# Patient Record
Sex: FEMALE | Race: WHITE | NOT HISPANIC OR LATINO | Employment: FULL TIME | URBAN - METROPOLITAN AREA
[De-identification: names, ages, dates, MRNs, and addresses within clinical notes are randomized per-mention and may not be internally consistent; named-entity substitution may affect disease eponyms.]

---

## 2020-06-16 ENCOUNTER — RECORDS - HEALTHEAST (OUTPATIENT)
Dept: LAB | Facility: CLINIC | Age: 20
End: 2020-06-16

## 2020-06-17 LAB
C TRACH DNA SPEC QL PROBE+SIG AMP: NEGATIVE
N GONORRHOEA DNA SPEC QL NAA+PROBE: NEGATIVE

## 2021-07-22 ENCOUNTER — LAB REQUISITION (OUTPATIENT)
Dept: LAB | Facility: CLINIC | Age: 21
End: 2021-07-22

## 2021-07-22 DIAGNOSIS — Z00.00 ENCOUNTER FOR GENERAL ADULT MEDICAL EXAMINATION WITHOUT ABNORMAL FINDINGS: ICD-10-CM

## 2021-07-22 PROCEDURE — 87491 CHLMYD TRACH DNA AMP PROBE: CPT | Performed by: NURSE PRACTITIONER

## 2021-07-25 LAB
C TRACH DNA SPEC QL PROBE+SIG AMP: NEGATIVE
N GONORRHOEA DNA SPEC QL NAA+PROBE: NEGATIVE

## 2022-08-04 ENCOUNTER — OFFICE VISIT (OUTPATIENT)
Dept: FAMILY MEDICINE | Facility: CLINIC | Age: 22
End: 2022-08-04
Payer: COMMERCIAL

## 2022-08-04 VITALS
TEMPERATURE: 98.2 F | HEART RATE: 77 BPM | SYSTOLIC BLOOD PRESSURE: 129 MMHG | RESPIRATION RATE: 21 BRPM | OXYGEN SATURATION: 98 % | WEIGHT: 170.5 LBS | DIASTOLIC BLOOD PRESSURE: 79 MMHG

## 2022-08-04 DIAGNOSIS — J06.9 ACUTE URI: Primary | ICD-10-CM

## 2022-08-04 PROCEDURE — U0003 INFECTIOUS AGENT DETECTION BY NUCLEIC ACID (DNA OR RNA); SEVERE ACUTE RESPIRATORY SYNDROME CORONAVIRUS 2 (SARS-COV-2) (CORONAVIRUS DISEASE [COVID-19]), AMPLIFIED PROBE TECHNIQUE, MAKING USE OF HIGH THROUGHPUT TECHNOLOGIES AS DESCRIBED BY CMS-2020-01-R: HCPCS | Performed by: FAMILY MEDICINE

## 2022-08-04 PROCEDURE — 99203 OFFICE O/P NEW LOW 30 MIN: CPT | Mod: CS | Performed by: FAMILY MEDICINE

## 2022-08-04 PROCEDURE — U0005 INFEC AGEN DETEC AMPLI PROBE: HCPCS | Performed by: FAMILY MEDICINE

## 2022-08-04 RX ORDER — NORGESTIMATE AND ETHINYL ESTRADIOL 7DAYSX3 LO
KIT ORAL
COMMUNITY
Start: 2021-07-22

## 2022-08-04 NOTE — LETTER
August 4, 2022      Amador Fitzpatrick  [address]  [facility] [address]        To Whom It May Concern,     Amador Fitzpatrick was seen today. Please excuse her from work until she is feeling better. Probably should be ok in 3-4 days.       Sincerely,        Chidi Quinn MD

## 2022-08-05 LAB — SARS-COV-2 RNA RESP QL NAA+PROBE: NEGATIVE

## 2022-08-05 NOTE — PROGRESS NOTES
ICD-10-CM    1. Acute URI  J06.9 Symptomatic; Unknown COVID-19 Virus (Coronavirus) by PCR Nose   likely viral. strong likelihood of covid given exposure. Symptomatic therapy and follow up discussed    -------------------------------  Amador Fitzpatrick with presents with 1 days symptoms including sore throat, nausea, cold sweats, mild lightheaded episode. No trouble breathing. No cough. .    Treatment measures tried include None tried.  Exposures--bf with covid currently.     Current Outpatient Medications   Medication Sig Dispense Refill     norgestim-eth estrad triphasic (TRI-LO-SPRINTEC) 0.18/0.215/0.25 MG-25 MCG tablet        sertraline (ZOLOFT) 50 MG tablet          ROS otherwise negative for resp., ID,  HEENT symptoms.    Objective: /79 (BP Location: Right arm, Patient Position: Sitting, Cuff Size: Adult Regular)   Pulse 77   Temp 98.2  F (36.8  C) (Tympanic)   Resp 21   Wt 77.3 kg (170 lb 8 oz)   SpO2 98%   Exam:  GENERAL APPEARANCE: healthy, alert and no distress  EYES: Eyes grossly normal to inspection  HENT: ear canals and TM's normal and nose and mouth without ulcers or lesions  NECK: no adenopathy, no asymmetry, masses, or scars and thyroid normal to palpation  RESP: lungs clear to auscultation - no rales, rhonchi or wheezes  CV: regular rates and rhythm, no murmur

## 2022-08-05 NOTE — PATIENT INSTRUCTIONS
What are the symptoms of COVID-19?  Symptoms can include fever, cough, shortness of breath, chills, headache, muscle pain sore throat, fatigue, runny or stuffy nose, and loss of taste and smell. Other less common symptoms include nausea, vomiting, or diarrhea (watery stools).    Know when to call 911. Emergency warning signs include:  Trouble breathing or shortness of breath  Pain or pressure in the chest that doesn't go away  Feeling confused like you haven't felt before, or not being able to wake up  Bluish-colored lips or face    How can I take care of myself?  Get lots of rest. Drink extra fluids (unless a doctor has told you not to).  Take Tylenol (acetaminophen) for fever or pain. If you have liver or kidney problems, ask your family doctor if it's okay to take Tylenol   Adults:   650 mg (two 325 mg pills or tablets) every 4 to 6 hours, or...   1,000 mg (two 500 mg pills or tablets) every 8 hours as needed.  Note: Don't take more than 3,000 mg in one day. Acetaminophen is found in many medicines (both prescribed and over the counter). Read all labels to be sure you don't take too much.  For children, check the Tylenol bottle for the right dose. The dose is based on the child's age or weight.  Take over the counter medicines for your symptoms as needed. Talk to your pharmacist.  If you have other health problems (like cancer, heart failure, an organ transplant, or severe kidney disease): Call your specialty clinic if you don't feel better in the next 2 days.    These guidelines are for isolating and quarantining before returning to work, school or .   For employers, schools and day cares: This is an official notice for this person's medical guidelines for returning in-person.   For health care sites: The CDC gives different isolation and quarantine guidelines for healthcare sites, please check with these sites before arriving.     How do I self-isolate?  You isolate when you have symptoms of COVID or a  test shows you have COVID, even if you don't have symptoms.   If you DO have symptoms:  Stay home and away from others  For at least 5 days after your symptoms started, AND   You are fever free for 24 hours (with no medicine that reduces fever), AND  Your other symptoms are better.  Wear a mask for 10 full days any time you are around others.  If you DON'T have symptoms:  Stay at home and away from others for at least 5 days after your positive test.  Wear a mask for 10 full days any time you are around others.    How and when do I quarantine?  You quarantine when you may have been exposed to the virus and DON'T have symptoms.   Stay home and away from others.   You must quarantine for 5 days after your last contact with a person who has COVID.  Note: If you are fully vaccinated, you don't need to quarantine. You should still follow the steps below.   Wear a mask for 10 full days any time you're around others.  Get tested at least 5 days after you were exposed, even if you don't have symptoms.   If you start to have symptoms, isolate right away and get tested.    Where can I get more information?  [facility] COVID-19 Resource Hub: [email or web address]   CDC Quarantine & Isolation: https://www.cdc.gov/coronavirus/2019-ncov/your-health/quarantine-isolation.html   CDC - What to Do If You're Sick: https://www.cdc.gov/coronavirus/2019-ncov/if-you-are-sick/index.html  [facility] clinical trials (COVID-19 research studies): [email or web address]  [place] Department of Health COVID-19 Public Hotline: 1-477.151.6055

## 2022-08-05 NOTE — PROGRESS NOTES
"{Dia}  -------------------------------  Amador Fitzpatrick with presents with *** days symptoms including {URI SYMPTOMS2:627428}.    Treatment measures tried include {URI TREATMENTS  TRIED:000650}.  Exposures--***  Recent travel--***    Current Outpatient Medications   Medication Sig Dispense Refill     norgestim-eth estrad triphasic (TRI-LO-SPRINTEC) 0.18/0.215/0.25 MG-25 MCG tablet        sertraline (ZOLOFT) 50 MG tablet          ROS otherwise negative for resp., ID,  HEENT symptoms.    Objective: /79 (BP Location: Right arm, Patient Position: Sitting, Cuff Size: Adult Regular)   Pulse 77   Temp 98.2  F (36.8  C) (Tympanic)   Resp 21   Wt 77.3 kg (170 lb 8 oz)   SpO2 98%   Exam:  GENERAL APPEARANCE: healthy, alert and no distress  EYES: Eyes grossly normal to inspection  HENT: { EXAM CPE - HENT:358055::\"ear canals and TM's normal\",\"nose and mouth without ulcers or lesions\"}  NECK: no adenopathy, no asymmetry, masses, or scars and thyroid normal to palpation  RESP: lungs clear to auscultation - no rales, rhonchi or wheezes  CV: regular rates and rhythm, no murmur    "

## 2022-10-01 ENCOUNTER — HEALTH MAINTENANCE LETTER (OUTPATIENT)
Age: 22
End: 2022-10-01

## 2023-10-21 ENCOUNTER — HEALTH MAINTENANCE LETTER (OUTPATIENT)
Age: 23
End: 2023-10-21

## 2024-09-17 ENCOUNTER — APPOINTMENT (OUTPATIENT)
Dept: RADIOLOGY | Facility: HOSPITAL | Age: 24
End: 2024-09-17
Attending: EMERGENCY MEDICINE
Payer: COMMERCIAL

## 2024-09-17 ENCOUNTER — APPOINTMENT (OUTPATIENT)
Dept: CT IMAGING | Facility: HOSPITAL | Age: 24
End: 2024-09-17
Attending: EMERGENCY MEDICINE
Payer: COMMERCIAL

## 2024-09-17 PROCEDURE — 99285 EMERGENCY DEPT VISIT HI MDM: CPT | Mod: 25

## 2024-09-17 PROCEDURE — 73700 CT LOWER EXTREMITY W/O DYE: CPT | Mod: RT

## 2024-09-17 PROCEDURE — 73562 X-RAY EXAM OF KNEE 3: CPT | Mod: RT

## 2024-09-17 PROCEDURE — 250N000013 HC RX MED GY IP 250 OP 250 PS 637: Performed by: EMERGENCY MEDICINE

## 2024-09-17 RX ORDER — OXYCODONE AND ACETAMINOPHEN 5; 325 MG/1; MG/1
1 TABLET ORAL ONCE
Status: COMPLETED | OUTPATIENT
Start: 2024-09-17 | End: 2024-09-17

## 2024-09-17 RX ADMIN — OXYCODONE HYDROCHLORIDE AND ACETAMINOPHEN 1 TABLET: 5; 325 TABLET ORAL at 22:11

## 2024-09-17 ASSESSMENT — COLUMBIA-SUICIDE SEVERITY RATING SCALE - C-SSRS
1. IN THE PAST MONTH, HAVE YOU WISHED YOU WERE DEAD OR WISHED YOU COULD GO TO SLEEP AND NOT WAKE UP?: NO
2. HAVE YOU ACTUALLY HAD ANY THOUGHTS OF KILLING YOURSELF IN THE PAST MONTH?: NO
6. HAVE YOU EVER DONE ANYTHING, STARTED TO DO ANYTHING, OR PREPARED TO DO ANYTHING TO END YOUR LIFE?: NO

## 2024-09-18 ENCOUNTER — HOSPITAL ENCOUNTER (INPATIENT)
Facility: HOSPITAL | Age: 24
LOS: 1 days | Discharge: HOME | End: 2024-09-18
Attending: EMERGENCY MEDICINE | Admitting: FAMILY MEDICINE
Payer: COMMERCIAL

## 2024-09-18 VITALS
SYSTOLIC BLOOD PRESSURE: 125 MMHG | HEIGHT: 68 IN | TEMPERATURE: 98.4 F | DIASTOLIC BLOOD PRESSURE: 72 MMHG | OXYGEN SATURATION: 96 % | RESPIRATION RATE: 18 BRPM | WEIGHT: 176.5 LBS | BODY MASS INDEX: 26.75 KG/M2 | HEART RATE: 68 BPM

## 2024-09-18 DIAGNOSIS — M25.561 ACUTE PAIN OF RIGHT KNEE: ICD-10-CM

## 2024-09-18 DIAGNOSIS — S82.141A CLOSED FRACTURE OF RIGHT TIBIAL PLATEAU, INITIAL ENCOUNTER: Primary | ICD-10-CM

## 2024-09-18 PROBLEM — S82.209A FRACTURE, TIBIA: Status: ACTIVE | Noted: 2024-09-18

## 2024-09-18 LAB
ANION GAP SERPL CALCULATED.3IONS-SCNC: 12 MMOL/L (ref 7–15)
BUN SERPL-MCNC: 14.3 MG/DL (ref 6–20)
CALCIUM SERPL-MCNC: 9.2 MG/DL (ref 8.8–10.4)
CHLORIDE SERPL-SCNC: 105 MMOL/L (ref 98–107)
CREAT SERPL-MCNC: 0.91 MG/DL (ref 0.51–0.95)
EGFRCR SERPLBLD CKD-EPI 2021: 89 ML/MIN/1.73M2
ERYTHROCYTE [DISTWIDTH] IN BLOOD BY AUTOMATED COUNT: 11.8 % (ref 10–15)
GLUCOSE SERPL-MCNC: 107 MG/DL (ref 70–99)
HCO3 SERPL-SCNC: 24 MMOL/L (ref 22–29)
HCT VFR BLD AUTO: 35.8 % (ref 35–47)
HGB BLD-MCNC: 11.8 G/DL (ref 11.7–15.7)
MCH RBC QN AUTO: 29.6 PG (ref 26.5–33)
MCHC RBC AUTO-ENTMCNC: 33 G/DL (ref 31.5–36.5)
MCV RBC AUTO: 90 FL (ref 78–100)
PLATELET # BLD AUTO: 183 10E3/UL (ref 150–450)
POTASSIUM SERPL-SCNC: 3.6 MMOL/L (ref 3.4–5.3)
RBC # BLD AUTO: 3.98 10E6/UL (ref 3.8–5.2)
SODIUM SERPL-SCNC: 141 MMOL/L (ref 135–145)
WBC # BLD AUTO: 13.8 10E3/UL (ref 4–11)

## 2024-09-18 PROCEDURE — 85027 COMPLETE CBC AUTOMATED: CPT

## 2024-09-18 PROCEDURE — 36415 COLL VENOUS BLD VENIPUNCTURE: CPT

## 2024-09-18 PROCEDURE — 80048 BASIC METABOLIC PNL TOTAL CA: CPT

## 2024-09-18 PROCEDURE — 120N000001 HC R&B MED SURG/OB

## 2024-09-18 PROCEDURE — 250N000013 HC RX MED GY IP 250 OP 250 PS 637: Performed by: EMERGENCY MEDICINE

## 2024-09-18 PROCEDURE — 2W3LX3Z IMMOBILIZATION OF RIGHT LOWER EXTREMITY USING BRACE: ICD-10-PCS | Performed by: STUDENT IN AN ORGANIZED HEALTH CARE EDUCATION/TRAINING PROGRAM

## 2024-09-18 PROCEDURE — 99222 1ST HOSP IP/OBS MODERATE 55: CPT | Mod: GC

## 2024-09-18 PROCEDURE — 250N000013 HC RX MED GY IP 250 OP 250 PS 637

## 2024-09-18 RX ORDER — NORGESTIMATE AND ETHINYL ESTRADIOL 7DAYSX3 LO
1 KIT ORAL DAILY
Status: DISCONTINUED | OUTPATIENT
Start: 2024-09-18 | End: 2024-09-18 | Stop reason: HOSPADM

## 2024-09-18 RX ORDER — NALOXONE HYDROCHLORIDE 0.4 MG/ML
0.4 INJECTION, SOLUTION INTRAMUSCULAR; INTRAVENOUS; SUBCUTANEOUS
Status: DISCONTINUED | OUTPATIENT
Start: 2024-09-18 | End: 2024-09-18 | Stop reason: HOSPADM

## 2024-09-18 RX ORDER — LIDOCAINE 40 MG/G
CREAM TOPICAL
Status: DISCONTINUED | OUTPATIENT
Start: 2024-09-18 | End: 2024-09-18 | Stop reason: HOSPADM

## 2024-09-18 RX ORDER — CALCIUM CARBONATE 500 MG/1
1000 TABLET, CHEWABLE ORAL 4 TIMES DAILY PRN
Status: DISCONTINUED | OUTPATIENT
Start: 2024-09-18 | End: 2024-09-18 | Stop reason: HOSPADM

## 2024-09-18 RX ORDER — DICLOFENAC SODIUM 75 MG/1
75 TABLET, DELAYED RELEASE ORAL 2 TIMES DAILY PRN
COMMUNITY
Start: 2023-10-20 | End: 2024-09-18

## 2024-09-18 RX ORDER — LAMOTRIGINE 25 MG/1
50 TABLET ORAL 2 TIMES DAILY
Status: DISCONTINUED | OUTPATIENT
Start: 2024-09-18 | End: 2024-09-18

## 2024-09-18 RX ORDER — OXYCODONE AND ACETAMINOPHEN 5; 325 MG/1; MG/1
0.5 TABLET ORAL EVERY 6 HOURS PRN
Qty: 20 TABLET | Refills: 0 | Status: SHIPPED | OUTPATIENT
Start: 2024-09-18

## 2024-09-18 RX ORDER — ACETAMINOPHEN 325 MG/1
650 TABLET ORAL EVERY 6 HOURS
Qty: 60 TABLET | Refills: 0 | Status: SHIPPED | OUTPATIENT
Start: 2024-09-18

## 2024-09-18 RX ORDER — NALOXONE HYDROCHLORIDE 0.4 MG/ML
0.2 INJECTION, SOLUTION INTRAMUSCULAR; INTRAVENOUS; SUBCUTANEOUS
Status: DISCONTINUED | OUTPATIENT
Start: 2024-09-18 | End: 2024-09-18 | Stop reason: HOSPADM

## 2024-09-18 RX ORDER — LEVETIRACETAM 750 MG/1
1 TABLET ORAL 2 TIMES DAILY
COMMUNITY
Start: 2024-08-17

## 2024-09-18 RX ORDER — ONDANSETRON 4 MG/1
4 TABLET, ORALLY DISINTEGRATING ORAL EVERY 6 HOURS PRN
Status: DISCONTINUED | OUTPATIENT
Start: 2024-09-18 | End: 2024-09-18 | Stop reason: HOSPADM

## 2024-09-18 RX ORDER — ACETAMINOPHEN 325 MG/1
975 TABLET ORAL EVERY 6 HOURS
Status: DISCONTINUED | OUTPATIENT
Start: 2024-09-18 | End: 2024-09-18 | Stop reason: HOSPADM

## 2024-09-18 RX ORDER — OXYCODONE AND ACETAMINOPHEN 5; 325 MG/1; MG/1
0.5 TABLET ORAL EVERY 6 HOURS PRN
Qty: 12 TABLET | Refills: 0 | Status: SHIPPED | OUTPATIENT
Start: 2024-09-18 | End: 2024-09-18

## 2024-09-18 RX ORDER — LAMOTRIGINE 25 MG/1
25 TABLET ORAL 2 TIMES DAILY
COMMUNITY
Start: 2024-08-28

## 2024-09-18 RX ORDER — IBUPROFEN 600 MG/1
600 TABLET, FILM COATED ORAL ONCE
Status: COMPLETED | OUTPATIENT
Start: 2024-09-18 | End: 2024-09-18

## 2024-09-18 RX ORDER — LAMOTRIGINE 25 MG/1
25 TABLET ORAL 2 TIMES DAILY
Status: DISCONTINUED | OUTPATIENT
Start: 2024-09-18 | End: 2024-09-18 | Stop reason: HOSPADM

## 2024-09-18 RX ORDER — ACETAMINOPHEN 325 MG/1
975 TABLET ORAL EVERY 6 HOURS
Qty: 60 TABLET | Refills: 0 | Status: SHIPPED | OUTPATIENT
Start: 2024-09-18 | End: 2024-09-18

## 2024-09-18 RX ORDER — AMOXICILLIN 250 MG
1 CAPSULE ORAL 2 TIMES DAILY PRN
Status: DISCONTINUED | OUTPATIENT
Start: 2024-09-18 | End: 2024-09-18 | Stop reason: HOSPADM

## 2024-09-18 RX ORDER — LAMOTRIGINE 25 MG/1
25 TABLET ORAL 2 TIMES DAILY
Status: DISCONTINUED | OUTPATIENT
Start: 2024-09-18 | End: 2024-09-18

## 2024-09-18 RX ORDER — OXYCODONE HYDROCHLORIDE 5 MG/1
5 TABLET ORAL EVERY 4 HOURS PRN
Status: DISCONTINUED | OUTPATIENT
Start: 2024-09-18 | End: 2024-09-18 | Stop reason: HOSPADM

## 2024-09-18 RX ORDER — ACETAMINOPHEN 650 MG/1
650 SUPPOSITORY RECTAL EVERY 6 HOURS
Status: DISCONTINUED | OUTPATIENT
Start: 2024-09-18 | End: 2024-09-18

## 2024-09-18 RX ORDER — AMOXICILLIN 250 MG
2 CAPSULE ORAL 2 TIMES DAILY PRN
Status: DISCONTINUED | OUTPATIENT
Start: 2024-09-18 | End: 2024-09-18 | Stop reason: HOSPADM

## 2024-09-18 RX ORDER — ONDANSETRON 2 MG/ML
4 INJECTION INTRAMUSCULAR; INTRAVENOUS EVERY 6 HOURS PRN
Status: DISCONTINUED | OUTPATIENT
Start: 2024-09-18 | End: 2024-09-18 | Stop reason: HOSPADM

## 2024-09-18 RX ORDER — POLYETHYLENE GLYCOL 3350 17 G/17G
17 POWDER, FOR SOLUTION ORAL 2 TIMES DAILY PRN
Status: DISCONTINUED | OUTPATIENT
Start: 2024-09-18 | End: 2024-09-18 | Stop reason: HOSPADM

## 2024-09-18 RX ADMIN — SERTRALINE HYDROCHLORIDE 50 MG: 50 TABLET ORAL at 04:09

## 2024-09-18 RX ADMIN — IBUPROFEN 600 MG: 600 TABLET, FILM COATED ORAL at 02:11

## 2024-09-18 RX ADMIN — ACETAMINOPHEN 975 MG: 325 TABLET ORAL at 04:09

## 2024-09-18 RX ADMIN — OXYCODONE HYDROCHLORIDE 2.5 MG: 5 TABLET ORAL at 15:16

## 2024-09-18 RX ADMIN — LAMOTRIGINE 25 MG: 25 TABLET ORAL at 09:20

## 2024-09-18 RX ADMIN — OXYCODONE HYDROCHLORIDE 2.5 MG: 5 TABLET ORAL at 07:37

## 2024-09-18 RX ADMIN — ACETAMINOPHEN 975 MG: 325 TABLET ORAL at 09:20

## 2024-09-18 RX ADMIN — LEVETIRACETAM 750 MG: 500 TABLET, FILM COATED ORAL at 09:20

## 2024-09-18 RX ADMIN — OXYCODONE HYDROCHLORIDE 2.5 MG: 5 TABLET ORAL at 12:19

## 2024-09-18 ASSESSMENT — ACTIVITIES OF DAILY LIVING (ADL)
ADLS_ACUITY_SCORE: 35
DEPENDENT_IADLS:: INDEPENDENT
ADLS_ACUITY_SCORE: 35

## 2024-09-18 NOTE — DISCHARGE SUMMARY
"[facility]  Discharge Summary - Medicine & Pediatrics       Date of Admission:  9/18/2024  Date of Discharge:  9/18/2024  Discharging Provider: Dr Ann  Discharge Service: Hospitalist Service    Discharge Diagnoses   Rt leg injury  R Tibial plateau fracture    Clinically Significant Risk Factors     # Overweight: Estimated body mass index is 26.84 kg/m  as calculated from the following:    Height as of this encounter: 1.727 m (5' 8\").    Weight as of this encounter: 80.1 kg (176 lb 8 oz).       Follow-ups Needed After Discharge   Follow-up Appointments     Follow-up and recommended labs and tests       Follow-up with [facility], [address], [place]  Call [phone] to request appointment  A referral was placed by our team        {Additional important follow-up instructions/to-do's for PCP      Follow-up tibia fracture      Discharge Disposition   Discharged to home  Condition at discharge: Stable    Hospital Course   Amador Fitzpatrick is a 24 year old female admitted on 9/18/2024. She has a history of depression and epilepsy and is admitted for right knee pain and swelling after leg injury.  Waiting Orthopedic recs prior to discharge.     The following problems were addressed during her hospitalization:    Rt leg injury  R Tibial plateau fracture  Patient presented after right leg injury while playing soccer and presented with R knee pain, swelling and loss of ROM. Imaging showed mildly displaced right tibial plateau fracture.  ED provider discussed with [facility] orthopedic physician and plan was for pain management and possible outpatient follow-up.  However patient continued to be have significant knee pain and brace/cast couldn't be successfully placed.  Patient notes that she would like to avoid opioid pain meds if possible.   - Tylenol scheduled   - oxycodone PRN  - NPO  - Ortho surgery consult, appreciate recs              - recommend Surgery with dedicated " ortho-trauma doctor at [facility] within 1 week              - OK to discharge with urgent outpatient referral to [facility]              - Knee immobilizer     Hx of epilepsy   Seizures diagnosed in childhood, has been on keppra for several years.  Most recently seen by [facility],  8/28/2024 routine EEG was normal plan was to continue Keppra 750 mg twice daily and was started on lamotrigine. Patient notes that last seizure was in childhood around 6th grade.   - continue PTA keppra and lamictal   - could conside echecking keppra level     Anxiety and depression  Continue PTA Zoloft 50 mg daily    Consultations This Hospital Stay   CARE MANAGEMENT / SOCIAL WORK IP CONSULT  PHYSICAL THERAPY ADULT IP CONSULT  ORTHOPEDIC SURGERY IP CONSULT    Code Status   Full Code       The patient was discussed with Dr. Delmi Ann MD    ______________________________________________________________________    Physical Exam   Vital Signs: Temp: 98.4  F (36.9  C) Temp src: Oral BP: 122/68 Pulse: 68   Resp: 18 SpO2: 98 % O2 Device: None (Room air)    Weight: 176 lbs 8 oz    GENERAL: healthy, alert and no distress  RESP: speaking in full sentences, normal work of breathing   CV: extremities well perfused  MS: Right knee with diffuse edema and tenderness mostly on the medial and lateral aspects of the knee. ROM very limited due to pain  PSYCH: mentation appears normal, affect normal/bright       Primary Care Physician   Hannah Dooley    Discharge Orders      Crutches     Orthopedic  Referral      Reason for your hospital stay    You were hospitalized for fracture of tibia.     Follow-up and recommended labs and tests     Follow-up with [facility], [address], [place]  Call [phone] to request appointment  A referral was placed by our team     Activity    Your activity upon discharge: activity as tolerated with Right knee immobilized     NO weight bearing    No  weight bearing on your operative extremity.     Knee Immobilizer    Remove knee immobilizer twice daily for skin assessment and hygiene. Wear knee immobilizer at all times except for skin assessment and hygiene. When removing, ensure that you are safely seated or lying in bed.     Diet    Follow this diet upon discharge: Current Diet:Orders Placed This Encounter      NPO per Anesthesia Guidelines for Procedure/Surgery Except for: Meds       Significant Results and Procedures   Most Recent 3 CBC's:  Recent Labs   Lab Test 09/18/24  0404   WBC 13.8*   HGB 11.8   MCV 90        Most Recent 3 BMP's:  Recent Labs   Lab Test 09/18/24  0404      POTASSIUM 3.6   CHLORIDE 105   CO2 24   BUN 14.3   CR 0.91   ANIONGAP 12   VINCE 9.2   *   ,   Results for orders placed or performed during the hospital encounter of 09/18/24   XR Knee Right 3 Views    Narrative    EXAM: XR KNEE RIGHT 3 VIEWS  LOCATION: [facility]  DATE: 9/17/2024    INDICATION: fall, pain  COMPARISON: None.      Impression    IMPRESSION: Acute displaced vertically oriented fracture of the lateral tibial plateau. CT may be useful for further evaluation. Moderate lipohemarthrosis of the knee. Soft tissue swelling about the knee.   CT Knee Right w/o Contrast    Narrative    EXAM: CT KNEE RIGHT W/O CONTRAST  LOCATION: [facility]  DATE: 9/18/2024    INDICATION: tibial plateau fx  COMPARISON: X-ray 9/17/2024  TECHNIQUE: Noncontrast. Axial, sagittal and coronal thin-section reconstruction. Dose reduction techniques were used.     FINDINGS:     BONES:  -Mildly displaced vertical lateral tibial plateau fracture. Mild joint space depression anteriorly. Nondisplaced fracture component extends into the anterior intercondylar region.    SOFT TISSUES:  -Soft tissue edema greatest about the medial aspect of the knee. Small lipohemarthrosis.      Impression    IMPRESSION:  1.  Mildly displaced lateral tibial  plateau fracture.         Discharge Medications   Current Discharge Medication List        START taking these medications    Details   acetaminophen (TYLENOL) 325 MG tablet Take 2 tablets (650 mg) by mouth every 6 hours.  Qty: 60 tablet, Refills: 0    Associated Diagnoses: Closed fracture of right tibial plateau, initial encounter      oxyCODONE-acetaminophen (PERCOCET) 5-325 MG tablet Take 0.5 tablets by mouth every 6 hours as needed for breakthrough pain.  Qty: 20 tablet, Refills: 0    Associated Diagnoses: Closed fracture of right tibial plateau, initial encounter           CONTINUE these medications which have NOT CHANGED    Details   lamoTRIgine (LAMICTAL) 25 MG tablet Take 25 mg by mouth 2 times daily. Start with 1 tab daily for 2 weeks, then increase to 1 tab twice daily      levETIRAcetam (KEPPRA) 750 MG tablet Take 1 tablet by mouth 2 times daily.      norgestim-eth estrad triphasic (TRI-LO-SPRINTEC) 0.18/0.215/0.25 MG-25 MCG tablet       sertraline (ZOLOFT) 50 MG tablet Take 50 mg by mouth at bedtime.           STOP taking these medications       diclofenac (VOLTAREN) 75 MG EC tablet Comments:   Reason for Stopping:             Allergies   No Known Allergies

## 2024-09-18 NOTE — PROGRESS NOTES
[facility]    Progress Note - Hospitalist Service       Date of Admission:  9/18/2024    Assessment & Plan   Amador Fitzpatrick is a 24 year old female admitted on 9/18/2024. She has a history of depression and epilepsy and is admitted for right knee pain and swelling after leg injury.  Waiting Orthopedic recs prior to discharge.     Rt leg injury  R Tibial plateau fracture  Patient presented after right leg injury while playing soccer and presented with R knee pain, swelling and loss of ROM. Imaging showed mildly displaced right tibial plateau fracture.  ED provider discussed with [facility] orthopedic physician and plan was for pain management and possible outpatient follow-up.  However patient continued to be have significant knee pain and brace/cast couldn't be successfully placed.  Patient notes that she would like to avoid opioid pain meds if possible.   - Tylenol scheduled   - oxycodone PRN  - NPO  - Ortho surgery consult, appreciate recs   - recommend Surgery with dedicated ortho-trauma doctor at [facility] within 1 week   - OK to discharge with urgent outpatient referral to [facility]   - Knee immobilizer     Hx of epilepsy   Seizures diagnosed in childhood, has been on keppra for several years.  Most recently seen by [facility],  8/28/2024 routine EEG was normal plan was to continue Keppra 750 mg twice daily and was started on lamotrigine. Patient notes that last seizure was in childhood around 6th grade.   - continue PTA keppra and lamictal   - could conside echecking keppra level     Anxiety and depression  Continue PTA Zoloft 50 mg daily        Diet: NPO per Anesthesia Guidelines for Procedure/Surgery Except for: Meds    DVT Prophylaxis: Pneumatic Compression Devices  Aguirre Catheter: Not present  Fluids: PO  Lines: None     Cardiac Monitoring: None  Code Status: Full Code      Clinically Significant Risk Factors Present on Admission                          #  "Overweight: Estimated body mass index is 26.84 kg/m  as calculated from the following:    Height as of this encounter: 1.727 m (5' 8\").    Weight as of this encounter: 80.1 kg (176 lb 8 oz).              Disposition Plan      Expected Discharge Date: 09/20/2024      Destination: home          The patient's care was discussed with the Attending Physician, Dr. Awad .    Lucas Ann MD  Hospitalist Service  [facility]  Securely message with [employer] (more info)  Text page via [employer] Paging/Directory   ______________________________________________________________________    Interval History     No acute events  Pain is well managed    Physical Exam   Vital Signs: Temp: 98.4  F (36.9  C) Temp src: Oral BP: 135/81 Pulse: 87   Resp: 20 SpO2: 99 % O2 Device: None (Room air)    Weight: 176 lbs 8 oz    GENERAL: healthy, alert and no distress  RESP: speaking in full sentences, normal work of breathing   CV: extremities well perfused  MS: Right knee with diffuse edema and tenderness mostly on the medial and lateral aspects of the knee. ROM very limited due to pain  PSYCH: mentation appears normal, affect normal/bright       Data     I have personally reviewed the following data over the past 24 hrs:    13.8 (H)  \   11.8   / 183     141 105 14.3 /  107 (H)   3.6 24 0.91 \       Imaging results reviewed over the past 24 hrs:   Recent Results (from the past 24 hour(s))   XR Knee Right 3 Views    Narrative    EXAM: XR KNEE RIGHT 3 VIEWS  LOCATION: [facility]  DATE: 9/17/2024    INDICATION: fall, pain  COMPARISON: None.      Impression    IMPRESSION: Acute displaced vertically oriented fracture of the lateral tibial plateau. CT may be useful for further evaluation. Moderate lipohemarthrosis of the knee. Soft tissue swelling about the knee.   CT Knee Right w/o Contrast    Narrative    EXAM: CT KNEE RIGHT W/O CONTRAST  LOCATION: [facility]  DATE: " 9/18/2024    INDICATION: tibial plateau fx  COMPARISON: X-ray 9/17/2024  TECHNIQUE: Noncontrast. Axial, sagittal and coronal thin-section reconstruction. Dose reduction techniques were used.     FINDINGS:     BONES:  -Mildly displaced vertical lateral tibial plateau fracture. Mild joint space depression anteriorly. Nondisplaced fracture component extends into the anterior intercondylar region.    SOFT TISSUES:  -Soft tissue edema greatest about the medial aspect of the knee. Small lipohemarthrosis.      Impression    IMPRESSION:  1.  Mildly displaced lateral tibial plateau fracture.

## 2024-09-18 NOTE — H&P
"    [facility]    History and Physical - Hospitalist Service       Date of Admission:  9/18/2024    Assessment & Plan      Amador Fitzpatrick is a 24 year old female admitted on 9/18/2024. She has a history of depression and epilepsy and is admitted for right knee pain and swelling after leg injury.      Rt leg injury  R Tibial plateau fracture  Patient presented after right leg injury while playing soccer and presented with R knee pain, swelling and loss of ROM. Imaging showed mildly displaced right tibial plateau fracture.  ED provider discussed with [place] orthopedic physician and plan was for pain management and possible outpatient follow-up.  However patient continued to be have significant knee pain and brace/cast couldn't be successfully placed.  Patient notes that she would like to avoid opioid pain meds if possible.   - Tylenol scheduled   - oxycodone PRN  - NPO  - Ortho surgery consult, appreciate recs    Hx of epilepsy   Seizures diagnosed in childhood, has been on keppra for several years.  Most recently seen by [facility],  8/28/2024 routine EEG was normal plan was to continue Keppra 750 mg twice daily and was started on lamotrigine. Patient notes that last seizure was in childhood around 6th grade.   - continue PTA keppra and lamictal   - could conside echecking keppra level    Anxiety and depression  Continue PTA Zoloft 50 mg daily              Diet: NPO per Anesthesia Guidelines for Procedure/Surgery Except for: Meds  DVT Prophylaxis: Pneumatic Compression Devices  Aguirre Catheter: Not present  Fluids: po  Lines: None     Cardiac Monitoring: None  Code Status: Full Code    Clinically Significant Risk Factors Present on Admission                          # Overweight: Estimated body mass index is 26.84 kg/m  as calculated from the following:    Height as of this encounter: 1.727 m (5' 8\").    Weight as of this encounter: 80.1 kg (176 lb 8 oz).                "     Disposition Plan      Expected Discharge Date: 09/20/2024                The patient's care was discussed with the  Day Team attendings.      Vasquez Rose MD  Hospitalist Service  [facility]  Securely message with [employer] (more info)  Text page via [employer] Paging/Directory   ______________________________________________________________________    Chief Complaint   Right knee pain and swelling    History is obtained from the patient    History of Present Illness   Amador Fitzpatrick is a 24 year old female who has a history of seizure disorder and depression.  She is admitted for evaluation of right knee pain after an injury while playing soccer.  While playing she got tackled by another team member and was hit on the medial aspect of her right knee she fell to the ground and felt sharp pain instantly.  Afterwards patient was not able to stand or bear weight and right knee pain continued to worsen.  She noticed swelling that has been increasing since the injury.      Patient otherwise denies shortness of breath chest pain or lightheadedness.  Patient has history of seizures is currently on Keppra and Lamictal. Reports seizures being diagnosed during early childhood and states her last seizure was around 6th grade.  Was seen by neurology last May 2023 advised to increase her Keppra but patient decided to continue on the same dose.      Past Medical History    No past medical history on file.    Past Surgical History   No past surgical history on file.    Prior to Admission Medications   Prior to Admission Medications   Prescriptions Last Dose Informant Patient Reported? Taking?   diclofenac (VOLTAREN) 75 MG EC tablet Unknown at prn Self Yes Yes   Sig: Take 75 mg by mouth 2 times daily as needed for moderate pain.   lamoTRIgine (LAMICTAL) 25 MG tablet 9/17/2024 at am Self Yes Yes   Sig: Take 25 mg by mouth 2 times daily. Start with 1 tab daily for 2 weeks, then increase to 1 tab twice daily    levETIRAcetam (KEPPRA) 750 MG tablet 9/17/2024 at am Self Yes Yes   Sig: Take 1 tablet by mouth 2 times daily.   norgestim-eth estrad triphasic (TRI-LO-SPRINTEC) 0.18/0.215/0.25 MG-25 MCG tablet 9/17/2024 at am Self Yes Yes   sertraline (ZOLOFT) 50 MG tablet 9/16/2024 at hs Self Yes Yes   Sig: Take 50 mg by mouth at bedtime.      Facility-Administered Medications: None        Review of Systems    The 5 point Review of Systems is negative other than noted in the HPI or here.     Social History   I have reviewed this patient's social history and updated it with pertinent information if needed.  Social History     Tobacco Use    Smoking status: Former    Smokeless tobacco: Former   Substance Use Topics    Alcohol use: Yes    Drug use: Never         Family History     No significant family history of similar concerns.      Allergies   No Known Allergies     Physical Exam   Vital Signs: Temp: 99.4  F (37.4  C) Temp src: Oral BP: 138/83 Pulse: 117   Resp: 18 SpO2: 97 % O2 Device: None (Room air)    Weight: 176 lbs 8 oz    Constitutional: awake, alert, cooperative, no apparent distress, and appears stated age  HEENT normocephalic, atraumatic, lids and lashes normal, extra ocular muscles intact, sclera clear, conjunctiva normal  Respiratory: No increased work of breathing, good air exchange, clear to auscultation bilaterally, no crackles or wheezing  Cardiovascular: regular rate and rhythm, normal S1 and S2, and no murmur noted  GI:  normal bowel sounds, soft, non-distended, non-tender, no masses palpated, no hepatosplenomegally  Skin: no bruising or bleeding and normal skin color, texture, turgor  Musculoskeletal: no lower extremity pitting edema present.  Right knee with diffuse edema and tenderness mostly on the medial and lateral aspects of the knee.  Neuropsychiatric: Level of consciousness: alert / normal  Affect: normal  Orientation: oriented to self, place, time and situation    Medical Decision Making            Data     I have personally reviewed the following data over the past 24 hrs:    13.8 (H)  \   11.8   / 183     141 105 14.3 /  107 (H)   3.6 24 0.91 \       Imaging results reviewed over the past 24 hrs:   Recent Results (from the past 24 hour(s))   XR Knee Right 3 Views    Narrative    EXAM: XR KNEE RIGHT 3 VIEWS  LOCATION: [facility]  DATE: 9/17/2024    INDICATION: fall, pain  COMPARISON: None.      Impression    IMPRESSION: Acute displaced vertically oriented fracture of the lateral tibial plateau. CT may be useful for further evaluation. Moderate lipohemarthrosis of the knee. Soft tissue swelling about the knee.   CT Knee Right w/o Contrast    Narrative    EXAM: CT KNEE RIGHT W/O CONTRAST  LOCATION: [facility]  DATE: 9/18/2024    INDICATION: tibial plateau fx  COMPARISON: X-ray 9/17/2024  TECHNIQUE: Noncontrast. Axial, sagittal and coronal thin-section reconstruction. Dose reduction techniques were used.     FINDINGS:     BONES:  -Mildly displaced vertical lateral tibial plateau fracture. Mild joint space depression anteriorly. Nondisplaced fracture component extends into the anterior intercondylar region.    SOFT TISSUES:  -Soft tissue edema greatest about the medial aspect of the knee. Small lipohemarthrosis.      Impression    IMPRESSION:  1.  Mildly displaced lateral tibial plateau fracture.

## 2024-09-18 NOTE — ED PROVIDER NOTES
EMERGENCY DEPARTMENT ENCOUNTER      NAME: Amador Fitzpatrick  AGE: 24 year old female  YOB: 2000  MRN: [id number]  EVALUATION DATE & TIME: No admission date for patient encounter.    ED PROVIDER: Wendy Maya MD    Chief Complaint   Patient presents with    Leg Injury       FINAL IMPRESSION  1. Closed fracture of right tibial plateau, initial encounter    2. Acute pain of right knee        MEDICAL DECISION MAKING   Pertinent Labs & Imaging studies reviewed. (See chart for details)    Amador Fitzpatrick is a 24 year old female who presents for evaluation of right knee pain.  Patient was playing in a soccer game just prior to arrival and was slide tackled by the goalie from the other team.  She believes that she was hit on the medial aspect of her right knee and fell to the ground.  She has not been able to stand up or bear any weight on her leg since the injury.  She localizes discomfort just below the patella diffusely on both sides but more severe on the inside of her right knee.  She has had some associated numbness around her knee and calf but does not have any pain in her foot or ankle.  She took some ibuprofen prior to coming in.  She has never injured this knee before.    I considered a broad differential including but not limited to fracture, dislocation, subluxation, soft tissue contusion, musculoskeletal sprain/strain, ligamentous injury. No overlying laceration/wounds to suggest open fracture. Distal CMS intact so less likely significant neurovascular injury. Will pursue workup with XR of knee and provide analgesia with percocet.    Reviewed x-ray which did reveal an acute vertically oriented tibial plateau fracture.  I updated the patient with these results and recommended that we proceed with CT scan for more evaluation.  She had improvement in pain with a dose of Percocet, though still was not able to straighten her knee out much beyond 30 degrees.  CT scan confirmed tibial plateau fracture.      I  discussed these results with [place] orthopedic surgery team who noted that if we are able to control patient's pain and get her into a knee immobilizer, and that she can safely ambulate with crutches, this may be amenable to outpatient follow-up.  If unable to ambulate safely or get into an immobilizer, will plan for admission.     I updated patient and her mother with results of CT scan as well as recommendations from orthopedic surgery team.  Unfortunately, patient has not been able to even move enough to get to the bathroom at bedside and is now using a pure wick.  She does not believe she would be able to straighten her knee enough to get into an immobilizer and mother does not feel particularly comfortable with her coming home tonight and trying to manage her pain and difficulty with ambulation at home.  With this, we did agree on plan for admission.  Will plan to have Ortho see the patient in the morning.  Patient would like to avoid strong pain medications if at all possible so we will plan to schedule Tylenol/Motrin and give her oxycodone as needed.  I discussed case with resident medical team who agreed to facilitate admission.        Considerations in Medical Decision Making  History:  Supplemental history from: Documented in chart and Other: Friends , mother  External Record(s) reviewed: Documented in chart    Work Up:  Chart documentation includes differential considered and any EKGs or imaging independently interpreted by provider, where specified.  In additional to work up documented, I considered the following work up: Documented in chart, if applicable.    External consultation:  Discussion of management with another provider: Orthopedics and Other: Resident    Complicating factors:  Care impacted by chronic illness: N/A  Care affected by social determinants of health: Access to Medical Care    Disposition considerations: Admit.      ED COURSE  9:40 PM I met with the patient, obtained history,  "performed an initial exam, and discussed options and plan for diagnostics and treatment here in the ED.   10:51 PM I updated the patient on their results.  1:06 AM I spoke with Dr. Fuentes, [facility]  2:00 AM I updated the patient on their results.  2:23 AM I spoke with Dr. Rose, Resident.    MEDICATIONS GIVEN IN THE ED  oxyCODONE-acetaminophen (PERCOCET) 5-325 MG per tablet 1 tablet (1 tablet Oral $Given 9/17/24 2211)   ibuprofen (ADVIL/MOTRIN) tablet 600 mg (600 mg Oral $Given 9/18/24 0211)       NEW PRESCRIPTIONS STARTED AT TODAY'S VISIT  New Prescriptions    No medications on file          =================================================================    HPI:    Patient information was obtained from: The patient    Use of : N/A      Amador Fitzpatrick is a 24 year old female who presents with leg injury.    The patient was playing soccer tonight and was in the process of kicking the ball, when the opposing goalie front slide tackled and hit the medial side of the patient's right knee with their foot. The patient was unable to walk due to the pain and has worsening pain to the area of injury when she raises or extends her right knee. She also associates swelling to the medial aspect of her right knee. She has numbness just below her right knee. She took 2 Advils prior to arrival. No complaints of ankle pain or any other associated symptoms at this time.      RELEVANT HISTORY, MEDICATIONS, & ALLERGIES   Past medical history, surgical history, family history, medications, and allergies reviewed and pertinent noted in HPI.    REVIEW OF SYSTEMS:  A complete review of systems was performed with pertinent positives and negatives noted in the HPI. All other systems negative.     PHYSICAL EXAM:    Vitals: /83   Pulse 117   Temp 99.4  F (37.4  C) (Oral)   Resp 18   Ht 1.727 m (5' 8\")   Wt 80.1 kg (176 lb 8 oz)   LMP 09/14/2024 (Approximate)   SpO2 97%   BMI 26.84 kg/m    General: Awake, " alert, interactive.  Sitting upright in wheelchair.  Appears uncomfortable and anxious but in no acute distress.  HENT: Atraumatic. MMM. Conjunctive clear.  Neck: Full AROM.  Cardiovascular: Tachycardic, regular  Respiratory/Chest: Normal work of breathing.   Abdomen: Non-distended.   Musculoskeletal: Normal appearing upper and left lower extremities without obvious deformities or signs of trauma.   RLE: Diffuse edema and tenderness to palpation about the knee, most prominent on bilateral aspects of patella.  Patella appears to be well-seated.  No tenderness to palpation of thigh, leg, ankle, foot.  Palpable distal pulses.  Sensation intact all distributions.  Skin: Normal color. No visible rash.  Neurologic: Alert, oriented. Speech clear. CN's grossly intact. Moving all extremities spontaneously.   Psychiatric: Normal affect/mood.       RADIOLOGY  CT Knee Right w/o Contrast   Final Result   IMPRESSION:   1.  Mildly displaced lateral tibial plateau fracture.         XR Knee Right 3 Views   Final Result   IMPRESSION: Acute displaced vertically oriented fracture of the lateral tibial plateau. CT may be useful for further evaluation. Moderate lipohemarthrosis of the knee. Soft tissue swelling about the knee.            I, Blas Schwartz, am serving as a scribe to document services personally performed by Dr. Wendy Maya based on my observation and the provider's statements to me. I, Wendy Maya MD attest that Blas Schwartz is acting in a scribe capacity, has observed my performance of the services and has documented them in accordance with my direction.    Wendy Maya M.D.  Emergency Medicine  [facility] EMERGENCY DEPARTMENT  [facility] [facility] [address]  [phone]  Dept: [phone]     Wendy Maya MD  09/18/24 0603

## 2024-09-18 NOTE — MEDICATION SCRIBE - ADMISSION MEDICATION HISTORY
Medication Scribe Admission Medication History    Admission medication history is complete. The information provided in this note is only as accurate as the sources available at the time of the update.    Information Source(s): Patient via in-person    Pertinent Information: Patient states that she took the last course of Lamotrigine 25 mg once daily dose yesterday and starting today, she will start taking Lamotrigine 25 mg bid.  Patient reported taking only morning doses and PM and HS doses have not yet taken.  Patient have reported taking two Ibuprofen tablets prior coming to ED.    Changes made to PTA medication list:  Added: Lamotrigine, Levetiracetam, Diclofenac (per patient and fill history)  Deleted: None  Changed: None    Allergies reviewed with patient and updates made in EHR: yes    Medication History Completed By: Ke Sawant 9/18/2024 1:58 AM    PTA Med List   Medication Sig Last Dose    diclofenac (VOLTAREN) 75 MG EC tablet Take 75 mg by mouth 2 times daily as needed for moderate pain. Unknown at prn    lamoTRIgine (LAMICTAL) 25 MG tablet Take 25 mg by mouth 2 times daily. Start with 1 tab daily for 2 weeks, then increase to 1 tab twice daily 9/17/2024 at am    levETIRAcetam (KEPPRA) 750 MG tablet Take 1 tablet by mouth 2 times daily. 9/17/2024 at am    norgestim-eth estrad triphasic (TRI-LO-SPRINTEC) 0.18/0.215/0.25 MG-25 MCG tablet  9/17/2024 at am    sertraline (ZOLOFT) 50 MG tablet Take 50 mg by mouth at bedtime. 9/16/2024 at hs

## 2024-09-18 NOTE — ED TRIAGE NOTES
Patient arrives to triage following right knee and leg injury from soccer.  Patient reports she went to make a kick for the goal and the goalie slid into patient's knee and leg with her feet.  Knee appears swollen, patient unable to bear weight on leg and unable to extend.  Sensation intact in right foot.  Endorses slight numbness in right knee.  Alert and oriented x4.     Took two Advil around 2100.

## 2024-09-18 NOTE — CONSULTS
ORTHOPEDIC CONSULTATION    Consultation  Amador Fitzpatrick,  2000, MRN [id number]    Fracture, tibia  Closed fracture of right tibial plateau, initial encounter  Acute pain of right knee    PCP: Hannah Dooley, [phone]   Code status:  Full Code       Extended Emergency Contact Information  Primary Emergency Contact: Adore Fitzpatrick  Mobile Phone: [phone]  Relation: Mother  Secondary Emergency Contact: EPIFANIO RAY  Mobile Phone: [phone]  Relation: Friend         IMPRESSION:  Right acute closed mildly displaced lateral tibial plateau fracture     PLAN:  This patient was discussed with Dr. Fuentes, on-call surgeon for [facility] and they are in agreement with the following plan.   -We would recommend be seen by dedicated traumatology team for further evaluation and surgical management.  Patient likely does require surgical intervention for fixation of this tibial plateau fracture but this would best be done by traumatology team.  Recommend follow-up at [facility]  - Okay for a diet today  -Nonweightbearing right lower extremity  -Knee immobilizer to the right knee to be worn at all times until follow-up  -Use of crutches to assist with nonweightbearing status  -Pain control per primary team.  Patient was seen after first dose of oral oxycodone.  She does feel that this is helping control her pain but does not like the overall sensation of this medication.  Could consider hydrocodone as an alternative.  -Orthopedically stable for discharge with close follow-up outpatient at [facility]    Thank you for including [facility] in the care of Amador Fitzpatrick. It has been a pleasure participating in their care.        CHIEF COMPLAINT: <principal problem not specified>    HISTORY OF PRESENT ILLNESS:  The patient is seen in orthopedic consultation at the request of Pradeep Awad MD for right tibial plateau fracture.  The patient is a 24 year old female    Today patient is experiencing  "pain in her right knee following a soccer injury yesterday.  She ran into a goalie while playing soccer whose foot made impact with her knee and she had immediate onset of severe pain in the knee and inability to bear weight on it.  She has no history of injuries to the knee in the past or surgeries.    ALLERGIES:   Review of patient's allergies indicates No Known Allergies      MEDICATIONS UPON ADMISSION:  Medications were reviewed.  They include:   (Not in a hospital admission)        SOCIAL HISTORY:   she  reports that she has quit smoking. She has quit using smokeless tobacco. She reports current alcohol use. She reports that she does not use drugs.      FAMILY HISTORY:  family history is not on file.      REVIEW OF SYSTEMS:   Reviewed with patient. See HPI, otherwise negative       PHYSICAL EXAMINATION:  Vitals: /68   Pulse 68   Temp 98.4  F (36.9  C) (Oral)   Resp 18   Ht 1.727 m (5' 8\")   Wt 80.1 kg (176 lb 8 oz)   LMP 09/14/2024 (Approximate)   SpO2 98%   BMI 26.84 kg/m    General: On examination, the patient is resting comfortably, NAD, awake, and alert and oriented to person, place, time, and, and general circumstances   SKIN: There is 2+ right knee effusion.  No breaks in the skin.  No erythema.  Pulses:  Dorsalis pedis and posterior tibialis pulse is intact and equal bilaterally  Sensation: intact and equal bilaterally to the distal lower extremities.  Tenderness: Tibial plateau tenderness noted.  No other bony tenderness  ROM: DF/PF intact and wiggles all toes.  Motor: TIB ANT, PF, ELH 5/5  Contralateral side= Full range of motion, Negative joint instability findings, 5/5 motor groups about the joint, Non-tender.       RADIOGRAPHIC EVALUATION:  Personally reviewed  EXAM: CT KNEE RIGHT W/O CONTRAST  LOCATION: [facility]  DATE: 9/18/2024     INDICATION: tibial plateau fx  COMPARISON: X-ray 9/17/2024  TECHNIQUE: Noncontrast. Axial, sagittal and coronal thin-section " reconstruction. Dose reduction techniques were used.      FINDINGS:      BONES:  -Mildly displaced vertical lateral tibial plateau fracture. Mild joint space depression anteriorly. Nondisplaced fracture component extends into the anterior intercondylar region.     SOFT TISSUES:  -Soft tissue edema greatest about the medial aspect of the knee. Small lipohemarthrosis.                                                                      IMPRESSION:  1.  Mildly displaced lateral tibial plateau fracture.    EXAM: XR KNEE RIGHT 3 VIEWS  LOCATION: [facility]  DATE: 9/17/2024     INDICATION: fall, pain  COMPARISON: None.                                                                      IMPRESSION: Acute displaced vertically oriented fracture of the lateral tibial plateau. CT may be useful for further evaluation. Moderate lipohemarthrosis of the knee. Soft tissue swelling about the knee.    PERTINENT LABS:  Personally reviewed  Recent Labs   Lab Test 09/18/24  0404   HGB 11.8            FELICIA KENNEDY PA-C  Date: 9/18/2024  Time: 2:02 PM  [facility]    CC1:   Pradeep Awad MD

## 2024-09-18 NOTE — CONSULTS
Care Management Initial Consult    General Information  Assessment completed with: Patient, patient  Type of CM/SW Visit: Initial Assessment    Primary Care Provider verified and updated as needed: Yes   Readmission within the last 30 days:        Reason for Consult: discharge planning  Advance Care Planning:            Communication Assessment  Patient's communication style: spoken language (English or Bilingual)             Cognitive  Cognitive/Neuro/Behavioral:                        Living Environment:   People in home: alone     Current living Arrangements: apartment      Able to return to prior arrangements: yes       Family/Social Support:  Care provided by: self  Provides care for: no one     Support system: Parent(s), Friend, Sibling(s)          Description of Support System: Supportive, Involved    Support Assessment: Adequate family and caregiver support, Adequate social supports    Current Resources:   Patient receiving home care services: No        Community Resources: None  Equipment currently used at home: none  Supplies currently used at home: None    Employment/Financial:  Employment Status: employed full-time        Financial Concerns:             Does the patient's insurance plan have a 3 day qualifying hospital stay waiver?  Yes     Which insurance plan 3 day waiver is available? Alternative insurance waiver    Will the waiver be used for post-acute placement? No    Lifestyle & Psychosocial Needs:  Social Determinants of Health     Food Insecurity: No Food Insecurity (4/24/2023)    Received from "[employer]    Food Insecurity     Worried About Running Out of Food in the Last Year: 1   Depression: At risk (8/28/2023)    Received from "[employer]    PHQ-2     PHQ-2 TOTAL SCORE: 4   Housing Stability: Low Risk  (4/24/2023)    Received from "[employer]    Housing Stability     Unable to Pay for Housing  in the Last Year: 1   Tobacco Use: Medium Risk (8/28/2024)    Received from [facility]     Patient History     Smoking Tobacco Use: Former     Smokeless Tobacco Use: Never     Passive Exposure: Not on file   Financial Resource Strain: Low Risk  (4/24/2023)    [facility]    Financial Resource Strain     Difficulty of Paying Living Expenses: 3     Difficulty of Paying Living Expenses: Not on file   Alcohol Use: Not on file   Transportation Needs: No Transportation Needs (4/24/2023)    Received from [facility]    Transportation Needs     Lack of Transportation (Medical): 1   Physical Activity: Not on file   Interpersonal Safety: Not on file   Stress: Not on file   Social Connections: Unknown (5/1/2024)    Received from [facility]    Social Connections     Frequency of Communication with Friends and Family: Not on file   Health Literacy: Not on file       Functional Status:  Prior to admission patient needed assistance:   Dependent ADLs:: Independent  Dependent IADLs:: Independent         Discussed  Partnership in Safe Discharge Planning  document with patient/family: No    Additional Information:  Met with patient, father and sister. Says she lives alone in an apartment on the first floor. No stairs. Is independent at baseline. Works full time for [employer]. No home care services or DME. Will need FMLA paperwork completed by provider    Therapy nga pending    Anticipate patient will return home with family support     Next Steps: continue to follow     Wendy Xavier RN

## 2024-09-29 ENCOUNTER — HEALTH MAINTENANCE LETTER (OUTPATIENT)
Age: 24
End: 2024-09-29